# Patient Record
Sex: MALE | Race: WHITE | NOT HISPANIC OR LATINO | Employment: PART TIME | URBAN - NONMETROPOLITAN AREA
[De-identification: names, ages, dates, MRNs, and addresses within clinical notes are randomized per-mention and may not be internally consistent; named-entity substitution may affect disease eponyms.]

---

## 2017-04-25 ENCOUNTER — OFFICE VISIT (OUTPATIENT)
Dept: RETAIL CLINIC | Facility: CLINIC | Age: 47
End: 2017-04-25

## 2017-04-25 DIAGNOSIS — Z02.1 PRE-EMPLOYMENT DRUG SCREENING: Primary | ICD-10-CM

## 2017-04-25 NOTE — PROGRESS NOTES
Bonifacio Milan is a 47 y.o. male.     Reason for Appointment  1. escreen    History of Present Illness  HPI:   See scanned document. See custody control form.    Assessments  1. Encounter for screening, unspecified - Z13.9      April 25, 2017 6:07 PM

## 2023-08-31 ENCOUNTER — OFFICE VISIT (OUTPATIENT)
Dept: CARDIOLOGY | Facility: CLINIC | Age: 53
End: 2023-08-31
Payer: COMMERCIAL

## 2023-08-31 VITALS
DIASTOLIC BLOOD PRESSURE: 84 MMHG | WEIGHT: 283.2 LBS | HEART RATE: 91 BPM | HEIGHT: 71 IN | BODY MASS INDEX: 39.65 KG/M2 | SYSTOLIC BLOOD PRESSURE: 118 MMHG

## 2023-08-31 DIAGNOSIS — E88.81 METABOLIC SYNDROME: ICD-10-CM

## 2023-08-31 DIAGNOSIS — R79.89 LOW TESTOSTERONE: ICD-10-CM

## 2023-08-31 DIAGNOSIS — R94.31 ABNORMAL EKG: ICD-10-CM

## 2023-08-31 DIAGNOSIS — R53.82 CHRONIC FATIGUE: ICD-10-CM

## 2023-08-31 DIAGNOSIS — R12 HEART BURN: ICD-10-CM

## 2023-08-31 DIAGNOSIS — Z79.4 TYPE 2 DIABETES MELLITUS WITHOUT COMPLICATION, WITH LONG-TERM CURRENT USE OF INSULIN: ICD-10-CM

## 2023-08-31 DIAGNOSIS — F17.200 SMOKING: ICD-10-CM

## 2023-08-31 DIAGNOSIS — I25.6 SILENT MYOCARDIAL ISCHEMIA: ICD-10-CM

## 2023-08-31 DIAGNOSIS — E11.9 TYPE 2 DIABETES MELLITUS WITHOUT COMPLICATION, WITH LONG-TERM CURRENT USE OF INSULIN: ICD-10-CM

## 2023-08-31 DIAGNOSIS — R06.02 SHORTNESS OF BREATH: Primary | ICD-10-CM

## 2023-08-31 PROBLEM — E88.810 METABOLIC SYNDROME: Status: ACTIVE | Noted: 2023-08-31

## 2023-08-31 RX ORDER — POLYETHYLENE GLYCOL 3350 17 G
2 POWDER IN PACKET (EA) ORAL AS NEEDED
COMMUNITY

## 2023-08-31 RX ORDER — ASPIRIN 81 MG/1
81 TABLET ORAL DAILY
Qty: 30 TABLET | Refills: 6 | Status: SHIPPED | OUTPATIENT
Start: 2023-08-31

## 2023-08-31 RX ORDER — CETIRIZINE HYDROCHLORIDE 10 MG/1
10 TABLET ORAL DAILY
COMMUNITY

## 2023-08-31 RX ORDER — FLUTICASONE PROPIONATE 50 MCG
2 SPRAY, SUSPENSION (ML) NASAL 2 TIMES DAILY
COMMUNITY

## 2023-08-31 RX ORDER — ALLOPURINOL 100 MG/1
100 TABLET ORAL DAILY PRN
COMMUNITY

## 2023-08-31 RX ORDER — FAMOTIDINE 40 MG/1
40 TABLET, FILM COATED ORAL DAILY
Qty: 30 TABLET | Refills: 6 | Status: SHIPPED | OUTPATIENT
Start: 2023-08-31

## 2023-08-31 NOTE — PROGRESS NOTES
Chief Complaint   Patient presents with    Establish Care     Patient referred per PCP for Hyperlipidemia, fatigue, and diabetes.    Fatigue     Feel tired all the time, no energy. He reports could just sit down and go to sleep anywhere.     Weight gain     Reports he had gained 100 lbs in the last 2 years. He has lost 60 lbs since February. He has decreased calorie intake. Has been dx with diabetes in the last 2 months.    Shortness of Breath     Notices with exertion, he feels related to weight.     Indigestion     Feels related to dx with alpha gal.    Aspirin     Patient does not take.     Family hx     Has strong family hx of heart disease.        CARDIAC COMPLAINTS  dyspnea, fatigue, and heartburn      Subjective   Cam Milan is a 53 y.o. male came in today for his initial cardiac evaluation.  He has history of significant obesity.  He has gained almost 100 pounds in the last few years.  He was seen at your office and found to be diabetic and was started on medication.  He also started going on a stricter diet in February and last about 60 pounds.  At this time he is still having symptoms secondary to obesity.  He is fatigued all the time.  He has no energy to do any activities.  He able to sit down and go to sleep immediately.  He also was told that he has a low testosterone and is scheduled to see a neurologist.  He noted shortness of breath on minimal exertion.  He denies having any orthopnea.  He also has heartburn and indigestion and he feels that it could be related to alpha gal.  He did undergo lab work and found that the A1c has come down to 6.9.  His PSA was normal.  His uric acid level was slightly elevated at 8.6.  His blood count is upper limit of normal with a hemoglobin of 16.7 and crit of 51.2.  His SGPT is mildly elevated at 75.  His total cholesterol is 169 with the LDL of 112.  His testosterone was 142.  He has been smoking since 2015.  He at this time cut down to 1 cigarette a day.  He  does use nicotine substitute.  He does have a family history of heart disease.  His mother and his paternal uncle had premature coronary disease.    No past surgical history on file.    Current Outpatient Medications   Medication Sig Dispense Refill    allopurinol (ZYLOPRIM) 100 MG tablet Take 1 tablet by mouth Daily As Needed.      cetirizine (zyrTEC) 10 MG tablet Take 1 tablet by mouth Daily.      fluticasone (FLONASE) 50 MCG/ACT nasal spray 2 sprays into the nostril(s) as directed by provider 2 (Two) Times a Day.      nicotine polacrilex (COMMIT) 2 MG lozenge Dissolve 1 lozenge in the mouth As Needed for Smoking Cessation.      Semaglutide, 1 MG/DOSE, (OZEMPIC) 2 MG/1.5ML solution pen-injector Inject 1 mg under the skin into the appropriate area as directed 1 (One) Time Per Week.      aspirin 81 MG EC tablet Take 1 tablet by mouth Daily. 30 tablet 6    famotidine (Pepcid) 40 MG tablet Take 1 tablet by mouth Daily. 30 tablet 6     No current facility-administered medications for this visit.           ALLERGIES:  Tylenol with codeine #3 [acetaminophen-codeine]    Past Medical History:   Diagnosis Date    Allergy to alpha-gal     Diabetes mellitus     History of kidney stones     Hx of foot surgery     right great toe    Hx of gout        Social History     Tobacco Use   Smoking Status Every Day    Types: Cigarettes    Start date: 2015   Smokeless Tobacco Never   Tobacco Comments    Smokes one cigarette in the morning and one at night          Family History   Problem Relation Age of Onset    Diabetes Mother     Hyperlipidemia Mother     Heart attack Mother     Heart disease Mother         stent placement    Hypertension Mother     No Known Problems Father     Diabetes Brother     Heart disease Paternal Uncle     Heart attack Paternal Uncle 45        CAGB x5    No Known Problems Maternal Grandmother     Stroke Maternal Grandfather     Heart attack Paternal Grandmother 76    Heart attack Paternal Grandfather     No  "Known Problems Son        Review of Systems   Constitutional: Positive for malaise/fatigue. Negative for decreased appetite.   HENT:  Negative for congestion and sore throat.    Eyes:  Negative for blurred vision, double vision and visual disturbance.   Cardiovascular:  Positive for dyspnea on exertion. Negative for chest pain.   Respiratory:  Positive for shortness of breath. Negative for snoring.    Endocrine: Negative for cold intolerance and heat intolerance.   Hematologic/Lymphatic: Negative for adenopathy. Does not bruise/bleed easily.   Skin:  Negative for itching, nail changes and skin cancer.   Musculoskeletal:  Negative for arthritis and myalgias.   Gastrointestinal:  Positive for heartburn. Negative for abdominal pain and dysphagia.   Genitourinary:  Negative for bladder incontinence and frequency.   Neurological:  Negative for dizziness, seizures and vertigo.   Psychiatric/Behavioral:  Negative for altered mental status.    Allergic/Immunologic: Negative for environmental allergies and hives.     Diabetes- Yes  Thyroid- normal    Objective     /84 (BP Location: Left arm)   Pulse 91   Ht 180.3 cm (71\")   Wt 128 kg (283 lb 3.2 oz)   BMI 39.50 kg/mý     Vitals and nursing note reviewed.   Constitutional:       Appearance: Healthy appearance. Not in distress.   Eyes:      Conjunctiva/sclera: Conjunctivae normal.      Pupils: Pupils are equal, round, and reactive to light.   HENT:      Head: Normocephalic.   Pulmonary:      Effort: Pulmonary effort is normal.      Breath sounds: Normal breath sounds.   Cardiovascular:      PMI at left midclavicular line. Normal rate. Regular rhythm.   Abdominal:      General: Bowel sounds are normal.      Palpations: Abdomen is soft.   Musculoskeletal: Normal range of motion.      Cervical back: Normal range of motion and neck supple. Skin:     General: Skin is warm and dry.   Neurological:      Mental Status: Alert, oriented to person, place, and time and oriented " to person, place and time.         ECG 12 Lead    Date/Time: 8/31/2023 12:27 PM  Performed by: Enma Edge MD  Authorized by: Enma Edge MD   Previous ECG: no previous ECG available  Rhythm: sinus rhythm  Rate: normal  Q waves: V3, V4, II and aVF    QRS axis: normal  Other findings: non-specific ST-T wave changes    Clinical impression: abnormal EKG          @ASSESSMENT/PLAN@  Class 2 Severe Obesity (BMI >=35 and <=39.9). Obesity-related health conditions include the following: obstructive sleep apnea, diabetes mellitus, and GERD. Obesity is newly identified. BMI is is above average; BMI management plan is completed. We discussed low calorie, low carb based diet program, portion control, and increasing exercise.     Diagnoses and all orders for this visit:    1. Shortness of breath (Primary)  -     Stress Test With Myocardial Perfusion One Day; Future  -     Adult Transthoracic Echo Complete W/ Cont if Necessary Per Protocol; Future    2. Type 2 diabetes mellitus without complication, with long-term current use of insulin    3. Chronic fatigue  -     Adult Transthoracic Echo Complete W/ Cont if Necessary Per Protocol; Future  -     Overnight Sleep Oximetry Study; Future  -     Vitamin B12; Future  -     Folate; Future    4. Low testosterone    5. Metabolic syndrome  -     Overnight Sleep Oximetry Study; Future    6. Abnormal EKG  -     Stress Test With Myocardial Perfusion One Day; Future  -     aspirin 81 MG EC tablet; Take 1 tablet by mouth Daily.  Dispense: 30 tablet; Refill: 6    7. Silent myocardial ischemia  -     Stress Test With Myocardial Perfusion One Day; Future  -     aspirin 81 MG EC tablet; Take 1 tablet by mouth Daily.  Dispense: 30 tablet; Refill: 6    8. Heart burn  -     famotidine (Pepcid) 40 MG tablet; Take 1 tablet by mouth Daily.  Dispense: 30 tablet; Refill: 6  -     H.pylori,IgG / IgA Antibodies; Future    9. Smoking    Other orders  -     ECG 12 Lead       At baseline his  heart rate is upper limit of normal.  His blood pressure is stable.  His EKG shows sinus rhythm with Q waves in the anterior leads as well as in the inferior leads.  His clinical examination reveals a BMI of 40.  His cardiovascular examination is otherwise unremarkable.    Regarding the shortness of breath which is a predominant symptom, it could be secondary to his obesity but need to rule out cardiac.  I scheduled him to undergo an echocardiogram to evaluate the LV function, LVH, left atrial size, PA pressure and to rule out any pericardial effusion    Regarding his diabetes, at this time he is just on Ozempic.  If his A1c continues to be elevated then consider adding metformin or Farxiga    Regarding his chronic fatigue, possibility of sleep apnea need to be considered.  Advised him to undergo nocturnal pulse PO2 measurement.  He may also need his B12 and folic acid level checked.    Regarding his low testosterone which also can cause fatigue, he is going to be seen by the urologist soon and hopefully will start on replacement    Regarding the metabolic syndrome, I encouraged him to continue the diet restriction and weight loss.  I gave him papers on Mediterranean diet    Regarding his abnormal EKG, talked to him about the possibility of silent ischemia causing some of the symptoms.  At this time advised him to be on aspirin 81 mg once a day.  I scheduled him to undergo a stress test in the form of Lexiscan since he is not able to walk.    Regarding his smoking, talked to him about the increased risk of developing malignancy, pulmonary complication as well as vascular complication.  I gave him papers to help him quit smoking.    Regarding his heartburn and indigestion, it could be from diabetes as well as from Ozempic but cannot rule out cardiac.  We will review the stress test when it is available.  Meanwhile I started him on Pepcid 40 mg once a day.  He may need his H. pylori checked.    Based on the results,  further recommendations will be made.             Electronically signed by Enma Edge MD August 31, 2023 12:28 EDT

## 2023-08-31 NOTE — LETTER
August 31, 2023       No Recipients    Patient: Cam Milan   YOB: 1970   Date of Visit: 8/31/2023     Dear RENE Betancur:       Thank you for referring Cam Milan to me for evaluation. Below are the relevant portions of my assessment and plan of care.    If you have questions, please do not hesitate to call me. I look forward to following Cam along with you.         Sincerely,        Enma Edge MD        CC:   No Recipients    Emna Edge MD  08/31/23 1228  Sign when Signing Visit  Cam Milan  reports that he has been smoking cigarettes. He started smoking about 8 years ago. He has never used smokeless tobacco.. I have educated him on the risk of diseases from using tobacco products such as cancer, COPD, and heart disease.     I advised him to quit and he is willing to quit. We have discussed the following method/s for tobacco cessation:  Education Material Counseling.  Together we have set a quit date for 1 week from today.  He will follow up with me in 6 months or sooner to check on his progress.    I spent 3  minutes counseling the patient.              Enma Edge MD  08/31/23 1228  Sign when Signing Visit  Chief Complaint   Patient presents with    Establish Care     Patient referred per PCP for Hyperlipidemia, fatigue, and diabetes.    Fatigue     Feel tired all the time, no energy. He reports could just sit down and go to sleep anywhere.     Weight gain     Reports he had gained 100 lbs in the last 2 years. He has lost 60 lbs since February. He has decreased calorie intake. Has been dx with diabetes in the last 2 months.    Shortness of Breath     Notices with exertion, he feels related to weight.     Indigestion     Feels related to dx with alpha gal.    Aspirin     Patient does not take.     Family hx     Has strong family hx of heart disease.        CARDIAC COMPLAINTS  dyspnea, fatigue, and heartburn      Subjective  Cam Milan is a 53  y.o. male came in today for his initial cardiac evaluation.  He has history of significant obesity.  He has gained almost 100 pounds in the last few years.  He was seen at your office and found to be diabetic and was started on medication.  He also started going on a stricter diet in February and last about 60 pounds.  At this time he is still having symptoms secondary to obesity.  He is fatigued all the time.  He has no energy to do any activities.  He able to sit down and go to sleep immediately.  He also was told that he has a low testosterone and is scheduled to see a neurologist.  He noted shortness of breath on minimal exertion.  He denies having any orthopnea.  He also has heartburn and indigestion and he feels that it could be related to alpha gal.  He did undergo lab work and found that the A1c has come down to 6.9.  His PSA was normal.  His uric acid level was slightly elevated at 8.6.  His blood count is upper limit of normal with a hemoglobin of 16.7 and crit of 51.2.  His SGPT is mildly elevated at 75.  His total cholesterol is 169 with the LDL of 112.  His testosterone was 142.  He has been smoking since 2015.  He at this time cut down to 1 cigarette a day.  He does use nicotine substitute.  He does have a family history of heart disease.  His mother and his paternal uncle had premature coronary disease.    No past surgical history on file.    Current Outpatient Medications   Medication Sig Dispense Refill    allopurinol (ZYLOPRIM) 100 MG tablet Take 1 tablet by mouth Daily As Needed.      cetirizine (zyrTEC) 10 MG tablet Take 1 tablet by mouth Daily.      fluticasone (FLONASE) 50 MCG/ACT nasal spray 2 sprays into the nostril(s) as directed by provider 2 (Two) Times a Day.      nicotine polacrilex (COMMIT) 2 MG lozenge Dissolve 1 lozenge in the mouth As Needed for Smoking Cessation.      Semaglutide, 1 MG/DOSE, (OZEMPIC) 2 MG/1.5ML solution pen-injector Inject 1 mg under the skin into the  appropriate area as directed 1 (One) Time Per Week.      aspirin 81 MG EC tablet Take 1 tablet by mouth Daily. 30 tablet 6    famotidine (Pepcid) 40 MG tablet Take 1 tablet by mouth Daily. 30 tablet 6     No current facility-administered medications for this visit.           ALLERGIES:  Tylenol with codeine #3 [acetaminophen-codeine]    Past Medical History:   Diagnosis Date    Allergy to alpha-gal     Diabetes mellitus     History of kidney stones     Hx of foot surgery     right great toe    Hx of gout        Social History     Tobacco Use   Smoking Status Every Day    Types: Cigarettes    Start date: 2015   Smokeless Tobacco Never   Tobacco Comments    Smokes one cigarette in the morning and one at night          Family History   Problem Relation Age of Onset    Diabetes Mother     Hyperlipidemia Mother     Heart attack Mother     Heart disease Mother         stent placement    Hypertension Mother     No Known Problems Father     Diabetes Brother     Heart disease Paternal Uncle     Heart attack Paternal Uncle 45        CAGB x5    No Known Problems Maternal Grandmother     Stroke Maternal Grandfather     Heart attack Paternal Grandmother 76    Heart attack Paternal Grandfather     No Known Problems Son        Review of Systems   Constitutional: Positive for malaise/fatigue. Negative for decreased appetite.   HENT:  Negative for congestion and sore throat.    Eyes:  Negative for blurred vision, double vision and visual disturbance.   Cardiovascular:  Positive for dyspnea on exertion. Negative for chest pain.   Respiratory:  Positive for shortness of breath. Negative for snoring.    Endocrine: Negative for cold intolerance and heat intolerance.   Hematologic/Lymphatic: Negative for adenopathy. Does not bruise/bleed easily.   Skin:  Negative for itching, nail changes and skin cancer.   Musculoskeletal:  Negative for arthritis and myalgias.   Gastrointestinal:  Positive for heartburn. Negative  "for abdominal pain and dysphagia.   Genitourinary:  Negative for bladder incontinence and frequency.   Neurological:  Negative for dizziness, seizures and vertigo.   Psychiatric/Behavioral:  Negative for altered mental status.    Allergic/Immunologic: Negative for environmental allergies and hives.     Diabetes- Yes  Thyroid- normal    Objective    /84 (BP Location: Left arm)   Pulse 91   Ht 180.3 cm (71\")   Wt 128 kg (283 lb 3.2 oz)   BMI 39.50 kg/mý     Vitals and nursing note reviewed.   Constitutional:       Appearance: Healthy appearance. Not in distress.   Eyes:      Conjunctiva/sclera: Conjunctivae normal.      Pupils: Pupils are equal, round, and reactive to light.   HENT:      Head: Normocephalic.   Pulmonary:      Effort: Pulmonary effort is normal.      Breath sounds: Normal breath sounds.   Cardiovascular:      PMI at left midclavicular line. Normal rate. Regular rhythm.   Abdominal:      General: Bowel sounds are normal.      Palpations: Abdomen is soft.   Musculoskeletal: Normal range of motion.      Cervical back: Normal range of motion and neck supple. Skin:     General: Skin is warm and dry.   Neurological:      Mental Status: Alert, oriented to person, place, and time and oriented to person, place and time.         ECG 12 Lead    Date/Time: 8/31/2023 12:27 PM  Performed by: Enma Edge MD  Authorized by: Enma Edge MD   Previous ECG: no previous ECG available  Rhythm: sinus rhythm  Rate: normal  Q waves: V3, V4, II and aVF    QRS axis: normal  Other findings: non-specific ST-T wave changes    Clinical impression: abnormal EKG          @ASSESSMENT/PLAN@  Class 2 Severe Obesity (BMI >=35 and <=39.9). Obesity-related health conditions include the following: obstructive sleep apnea, diabetes mellitus, and GERD. Obesity is newly identified. BMI is is above average; BMI management plan is completed. We discussed low calorie, low carb based diet program, portion control, and " increasing exercise.     Diagnoses and all orders for this visit:    1. Shortness of breath (Primary)  -     Stress Test With Myocardial Perfusion One Day; Future  -     Adult Transthoracic Echo Complete W/ Cont if Necessary Per Protocol; Future    2. Type 2 diabetes mellitus without complication, with long-term current use of insulin    3. Chronic fatigue  -     Adult Transthoracic Echo Complete W/ Cont if Necessary Per Protocol; Future  -     Overnight Sleep Oximetry Study; Future  -     Vitamin B12; Future  -     Folate; Future    4. Low testosterone    5. Metabolic syndrome  -     Overnight Sleep Oximetry Study; Future    6. Abnormal EKG  -     Stress Test With Myocardial Perfusion One Day; Future  -     aspirin 81 MG EC tablet; Take 1 tablet by mouth Daily.  Dispense: 30 tablet; Refill: 6    7. Silent myocardial ischemia  -     Stress Test With Myocardial Perfusion One Day; Future  -     aspirin 81 MG EC tablet; Take 1 tablet by mouth Daily.  Dispense: 30 tablet; Refill: 6    8. Heart burn  -     famotidine (Pepcid) 40 MG tablet; Take 1 tablet by mouth Daily.  Dispense: 30 tablet; Refill: 6  -     H.pylori,IgG / IgA Antibodies; Future    9. Smoking    Other orders  -     ECG 12 Lead       At baseline his heart rate is upper limit of normal.  His blood pressure is stable.  His EKG shows sinus rhythm with Q waves in the anterior leads as well as in the inferior leads.  His clinical examination reveals a BMI of 40.  His cardiovascular examination is otherwise unremarkable.    Regarding the shortness of breath which is a predominant symptom, it could be secondary to his obesity but need to rule out cardiac.  I scheduled him to undergo an echocardiogram to evaluate the LV function, LVH, left atrial size, PA pressure and to rule out any pericardial effusion    Regarding his diabetes, at this time he is just on Ozempic.  If his A1c continues to be elevated then consider adding metformin or Farxiga    Regarding his  chronic fatigue, possibility of sleep apnea need to be considered.  Advised him to undergo nocturnal pulse PO2 measurement.  He may also need his B12 and folic acid level checked.    Regarding his low testosterone which also can cause fatigue, he is going to be seen by the urologist soon and hopefully will start on replacement    Regarding the metabolic syndrome, I encouraged him to continue the diet restriction and weight loss.  I gave him papers on Mediterranean diet    Regarding his abnormal EKG, talked to him about the possibility of silent ischemia causing some of the symptoms.  At this time advised him to be on aspirin 81 mg once a day.  I scheduled him to undergo a stress test in the form of Lexiscan since he is not able to walk.    Regarding his smoking, talked to him about the increased risk of developing malignancy, pulmonary complication as well as vascular complication.  I gave him papers to help him quit smoking.    Regarding his heartburn and indigestion, it could be from diabetes as well as from Ozempic but cannot rule out cardiac.  We will review the stress test when it is available.  Meanwhile I started him on Pepcid 40 mg once a day.  He may need his H. pylori checked.    Based on the results, further recommendations will be made.             Electronically signed by Enma Edge MD August 31, 2023 12:28 EDT

## 2023-08-31 NOTE — PROGRESS NOTES
Cam Milan  reports that he has been smoking cigarettes. He started smoking about 8 years ago. He has never used smokeless tobacco.. I have educated him on the risk of diseases from using tobacco products such as cancer, COPD, and heart disease.     I advised him to quit and he is willing to quit. We have discussed the following method/s for tobacco cessation:  Education Material Counseling.  Together we have set a quit date for 1 week from today.  He will follow up with me in 6 months or sooner to check on his progress.    I spent 3  minutes counseling the patient.

## 2023-09-12 ENCOUNTER — HOSPITAL ENCOUNTER (OUTPATIENT)
Dept: CARDIOLOGY | Facility: HOSPITAL | Age: 53
Discharge: HOME | End: 2023-09-12
Payer: COMMERCIAL

## 2023-09-12 ENCOUNTER — LAB (OUTPATIENT)
Dept: LAB | Facility: HOSPITAL | Age: 53
End: 2023-09-12
Payer: COMMERCIAL

## 2023-09-12 DIAGNOSIS — R94.31 ABNORMAL EKG: ICD-10-CM

## 2023-09-12 DIAGNOSIS — R12 HEART BURN: ICD-10-CM

## 2023-09-12 DIAGNOSIS — I25.6 SILENT MYOCARDIAL ISCHEMIA: ICD-10-CM

## 2023-09-12 DIAGNOSIS — R53.82 CHRONIC FATIGUE: ICD-10-CM

## 2023-09-12 DIAGNOSIS — R06.02 SHORTNESS OF BREATH: ICD-10-CM

## 2023-09-12 LAB
BH CV ECHO MEAS - ACS: 2 CM
BH CV ECHO MEAS - AO MAX PG: 6.4 MMHG
BH CV ECHO MEAS - AO MEAN PG: 3 MMHG
BH CV ECHO MEAS - AO ROOT DIAM: 3.2 CM
BH CV ECHO MEAS - AO V2 MAX: 126 CM/SEC
BH CV ECHO MEAS - AO V2 VTI: 22.5 CM
BH CV ECHO MEAS - EDV(CUBED): 67.4 ML
BH CV ECHO MEAS - EDV(MOD-SP4): 86.5 ML
BH CV ECHO MEAS - EF(MOD-SP4): 61.4 %
BH CV ECHO MEAS - ESV(CUBED): 16.2 ML
BH CV ECHO MEAS - ESV(MOD-SP4): 33.4 ML
BH CV ECHO MEAS - FS: 37.8 %
BH CV ECHO MEAS - IVS/LVPW: 1.02 CM
BH CV ECHO MEAS - IVSD: 1.29 CM
BH CV ECHO MEAS - LA DIMENSION: 3.2 CM
BH CV ECHO MEAS - LAT PEAK E' VEL: 8.4 CM/SEC
BH CV ECHO MEAS - LV DIASTOLIC VOL/BSA (35-75): 35.4 CM2
BH CV ECHO MEAS - LV MASS(C)D: 187 GRAMS
BH CV ECHO MEAS - LV SYSTOLIC VOL/BSA (12-30): 13.7 CM2
BH CV ECHO MEAS - LVIDD: 4.1 CM
BH CV ECHO MEAS - LVIDS: 2.5 CM
BH CV ECHO MEAS - LVPWD: 1.27 CM
BH CV ECHO MEAS - MED PEAK E' VEL: 6.3 CM/SEC
BH CV ECHO MEAS - MV A MAX VEL: 81.4 CM/SEC
BH CV ECHO MEAS - MV DEC TIME: 0.24 MSEC
BH CV ECHO MEAS - MV E MAX VEL: 68.6 CM/SEC
BH CV ECHO MEAS - MV E/A: 0.84
BH CV ECHO MEAS - SI(MOD-SP4): 21.7 ML/M2
BH CV ECHO MEAS - SV(MOD-SP4): 53.1 ML
BH CV ECHO MEASUREMENTS AVERAGE E/E' RATIO: 9.33
BH CV REST NUCLEAR ISOTOPE DOSE: 10 MCI
BH CV STRESS COMMENTS STAGE 1: NORMAL
BH CV STRESS DOSE REGADENOSON STAGE 1: 0.4
BH CV STRESS DURATION MIN STAGE 1: 0
BH CV STRESS DURATION SEC STAGE 1: 10
BH CV STRESS NUCLEAR ISOTOPE DOSE: 30 MCI
BH CV STRESS PROTOCOL 1: NORMAL
BH CV STRESS RECOVERY BP: NORMAL MMHG
BH CV STRESS RECOVERY HR: 96 BPM
BH CV STRESS STAGE 1: 1
BH CV XLRA - RV BASE: 3.3 CM
BH CV XLRA - RV LENGTH: 8.1 CM
BH CV XLRA - RV MID: 3.2 CM
LEFT ATRIUM VOLUME INDEX: 8.4 ML/M2
LV EF NUC BP: 57 %
MAXIMAL PREDICTED HEART RATE: 169 BPM
PERCENT MAX PREDICTED HR: 62.13 %
STRESS BASELINE BP: NORMAL MMHG
STRESS BASELINE HR: 75 BPM
STRESS PERCENT HR: 73 %
STRESS POST PEAK BP: NORMAL MMHG
STRESS POST PEAK HR: 105 BPM
STRESS TARGET HR: 144 BPM

## 2023-09-12 PROCEDURE — 93018 CV STRESS TEST I&R ONLY: CPT | Performed by: INTERNAL MEDICINE

## 2023-09-12 PROCEDURE — 0 TECHNETIUM SESTAMIBI: Performed by: INTERNAL MEDICINE

## 2023-09-12 PROCEDURE — 93306 TTE W/DOPPLER COMPLETE: CPT | Performed by: INTERNAL MEDICINE

## 2023-09-12 PROCEDURE — 82607 VITAMIN B-12: CPT | Performed by: INTERNAL MEDICINE

## 2023-09-12 PROCEDURE — 82746 ASSAY OF FOLIC ACID SERUM: CPT | Performed by: INTERNAL MEDICINE

## 2023-09-12 PROCEDURE — 78452 HT MUSCLE IMAGE SPECT MULT: CPT | Performed by: INTERNAL MEDICINE

## 2023-09-12 PROCEDURE — 93017 CV STRESS TEST TRACING ONLY: CPT

## 2023-09-12 PROCEDURE — 25010000002 REGADENOSON 0.4 MG/5ML SOLUTION: Performed by: INTERNAL MEDICINE

## 2023-09-12 PROCEDURE — 78452 HT MUSCLE IMAGE SPECT MULT: CPT

## 2023-09-12 PROCEDURE — 93306 TTE W/DOPPLER COMPLETE: CPT

## 2023-09-12 PROCEDURE — A9500 TC99M SESTAMIBI: HCPCS | Performed by: INTERNAL MEDICINE

## 2023-09-12 PROCEDURE — 86677 HELICOBACTER PYLORI ANTIBODY: CPT | Performed by: INTERNAL MEDICINE

## 2023-09-12 RX ORDER — REGADENOSON 0.08 MG/ML
0.4 INJECTION, SOLUTION INTRAVENOUS
Status: COMPLETED | OUTPATIENT
Start: 2023-09-12 | End: 2023-09-12

## 2023-09-12 RX ADMIN — TECHNETIUM TC 99M SESTAMIBI 1 DOSE: 1 INJECTION INTRAVENOUS at 11:05

## 2023-09-12 RX ADMIN — TECHNETIUM TC 99M SESTAMIBI 1 DOSE: 1 INJECTION INTRAVENOUS at 10:31

## 2023-09-12 RX ADMIN — REGADENOSON 0.4 MG: 0.08 INJECTION, SOLUTION INTRAVENOUS at 11:05

## 2023-09-13 LAB
FOLATE SERPL-MCNC: 18.5 NG/ML (ref 4.78–24.2)
H PYLORI IGA SER-ACNC: <9 UNITS (ref 0–8.9)
H PYLORI IGG SER IA-ACNC: 0.43 INDEX VALUE (ref 0–0.79)
VIT B12 BLD-MCNC: 956 PG/ML (ref 211–946)

## 2023-10-30 ENCOUNTER — TELEPHONE (OUTPATIENT)
Dept: CARDIOLOGY | Facility: CLINIC | Age: 53
End: 2023-10-30
Payer: COMMERCIAL

## 2023-10-30 NOTE — TELEPHONE ENCOUNTER
Caller name: Cam Milan     Phone Number: [phone]    Surgeon's name: DR. ESCOBAR [phone] PHONE    Type of planned surgery: CANCER ON FOREHEAD    Date of planned surgery: 11-10-23    Type of anesthesia: NOT SURE    Have you been experiencing chest pain or shortness of breath? NO    Is your doctor requesting for you to stop any of your medications prior to your surgery? YES     Where should we fax the clearance to?

## 2023-11-01 ENCOUNTER — TELEPHONE (OUTPATIENT)
Dept: CARDIOLOGY | Facility: CLINIC | Age: 53
End: 2023-11-01
Payer: COMMERCIAL

## 2023-11-01 NOTE — TELEPHONE ENCOUNTER
Received fax from Dr. 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 Patient is on aspirin, but they are not requesting to hold. According to our records, I do not see where patient has had any stenting.          Fax [phone]

## 2024-02-29 ENCOUNTER — OFFICE VISIT (OUTPATIENT)
Dept: CARDIOLOGY | Facility: CLINIC | Age: 54
End: 2024-02-29
Payer: COMMERCIAL

## 2024-02-29 VITALS
HEART RATE: 64 BPM | DIASTOLIC BLOOD PRESSURE: 78 MMHG | BODY MASS INDEX: 36.31 KG/M2 | SYSTOLIC BLOOD PRESSURE: 112 MMHG | WEIGHT: 259.4 LBS | HEIGHT: 71 IN

## 2024-02-29 DIAGNOSIS — R53.82 CHRONIC FATIGUE: Primary | ICD-10-CM

## 2024-02-29 DIAGNOSIS — R06.02 SHORTNESS OF BREATH: ICD-10-CM

## 2024-02-29 DIAGNOSIS — E66.01 SEVERE OBESITY (BMI 35.0-39.9) WITH COMORBIDITY: ICD-10-CM

## 2024-02-29 DIAGNOSIS — R79.89 LOW TESTOSTERONE: ICD-10-CM

## 2024-02-29 NOTE — PROGRESS NOTES
Chief Complaint   Patient presents with    Follow-up     Cardiac management    Lab     Last labs a week ago per PCP.    Weight loss     Has stopped all foods with sugar, has been eating salads, fruits, and nuts.    Fatigue     Has greatly improved since last visit.     Med Refill     Does not need refills.        HPI:  NIKKO Milan is a 53 y.o. male with a history of significant obesity.  He has gained almost 100 pounds in the last few years.  He was found to be diabetic and was started on medication.  He also started going on a stricter diet in February 2023 and last about 60 pounds.  At his initial visit 8/31/2023 he was still having symptoms secondary to obesity, fatigued all the time and has no energy to do any activities.  He able to sit down and go to sleep immediately.  He also was told that he has a low testosterone and is scheduled to see a urololgist.  He noted shortness of breath on minimal exertion. He has been smoking since 2015.  He at this time cut down to 1 cigarette a day.  He does use nicotine substitute.  He does have a family history of heart disease.     He is here today for follow-up visit. Patient denies chest pain, pressure, palpitations, tightness, dizziness, shortness of air. He states he feels like a different person. He feels so much better. He has eliminated sugar and lost about 30 pounds since last visit and he reports 85 lb overall. He stated his testosterone level was up in 300s at last check. He has quit smoking since last visit.     Cardiac History:    Past Surgical History:   Procedure Laterality Date    CARDIOVASCULAR STRESS TEST  09/12/2023    Lexiscan- EF 57%. Negative    ECHO - CONVERTED  09/12/2023    TLS. EF 65%. Trace-Mild MR    OTHER SURGICAL HISTORY  09/05/2023    Pulse O2- Abnormal       Current Outpatient Medications   Medication Sig Dispense Refill    aspirin 81 MG EC tablet Take 1 tablet by mouth Daily. 30 tablet 6    cetirizine (zyrTEC) 10 MG tablet Take 1  "tablet by mouth Daily.      fluticasone (FLONASE) 50 MCG/ACT nasal spray 2 sprays into the nostril(s) as directed by provider 2 (Two) Times a Day.       No current facility-administered medications for this visit.       Tylenol with codeine #3 [acetaminophen-codeine]    Past Medical History:   Diagnosis Date    Allergy to alpha-gal     Basal cell carcinoma     Diabetes mellitus     History of kidney stones     Hx of foot surgery     right great toe    Hx of gout        Social History     Socioeconomic History    Marital status: Single   Tobacco Use    Smoking status: Former     Types: Cigarettes     Start date:      Quit date: 2023     Years since quittin.4    Smokeless tobacco: Never    Tobacco comments:     Smokes one cigarette in the morning and one at night   Vaping Use    Vaping Use: Never used   Substance and Sexual Activity    Alcohol use: Not Currently    Drug use: Never    Sexual activity: Defer       Family History   Problem Relation Age of Onset    Diabetes Mother     Hyperlipidemia Mother     Heart attack Mother     Heart disease Mother         stent placement    Hypertension Mother     No Known Problems Father     Diabetes Brother     Heart disease Paternal Uncle     Heart attack Paternal Uncle 45        CAGB x5    No Known Problems Maternal Grandmother     Stroke Maternal Grandfather     Heart attack Paternal Grandmother 76    Heart attack Paternal Grandfather     No Known Problems Son        Vitals:   /78 (BP Location: Left arm)   Pulse 64   Ht 180.3 cm (70.98\")   Wt 118 kg (259 lb 6.4 oz)   BMI 36.20 kg/m²     Physical Exam  Vitals and nursing note reviewed.   Constitutional:       Appearance: He is obese.   Neck:      Vascular: No carotid bruit.   Cardiovascular:      Rate and Rhythm: Normal rate and regular rhythm.      Pulses: Normal pulses.      Heart sounds: Normal heart sounds. No murmur heard.     No friction rub. No gallop.   Pulmonary:      Effort: Pulmonary effort is " normal.      Breath sounds: Normal breath sounds and air entry.   Musculoskeletal:      Right lower leg: No edema.      Left lower leg: No edema.   Skin:     General: Skin is warm and dry.      Capillary Refill: Capillary refill takes less than 2 seconds.   Neurological:      Mental Status: He is alert and oriented to person, place, and time.       Procedures     Assessment & Plan     Diagnoses and all orders for this visit:    1. Chronic fatigue (Primary)    2. Shortness of breath    3. Low testosterone    4. Severe obesity (BMI 35.0-39.9) with comorbidity    Chronic fatigue/shortness of breath  - Mostly resolved    Low testosterone  - Reported increasing testosterone levels since initial testing  - Has not followed up with urology, with plans to    Severe obesity  - He has had a 25 pound weight loss since last visit and overall 85 pound weight loss  - Has eliminated sugar and feels much better  - Continue with current lifestyle modifications and adding exercise    No refills requested.  Stable cardiac wise with no further testing recommended at this time and no changes made in medications.    Visit Diagnoses:    ICD-10-CM ICD-9-CM   1. Chronic fatigue  R53.82 780.79   2. Shortness of breath  R06.02 786.05   3. Low testosterone  R79.89 790.99   4. Severe obesity (BMI 35.0-39.9) with comorbidity  E66.01 278.01       Meds Ordered During Visit:  No orders of the defined types were placed in this encounter.      Follow Up Appointment:   Return in about 1 year (around 2/28/2025), or if symptoms worsen or fail to improve.           This document has been electronically signed by RENE Benavidez  February 29, 2024 11:22 EST    Dictated Utilizing Dragon Dictation: Part of this note may be an electronic transcription/translation of spoken language to printed text using the Dragon Dictation System.